# Patient Record
Sex: MALE | Race: BLACK OR AFRICAN AMERICAN | Employment: FULL TIME | URBAN - METROPOLITAN AREA
[De-identification: names, ages, dates, MRNs, and addresses within clinical notes are randomized per-mention and may not be internally consistent; named-entity substitution may affect disease eponyms.]

---

## 2019-02-09 ENCOUNTER — APPOINTMENT (OUTPATIENT)
Dept: GENERAL RADIOLOGY | Age: 44
End: 2019-02-09
Attending: EMERGENCY MEDICINE
Payer: COMMERCIAL

## 2019-02-09 ENCOUNTER — HOSPITAL ENCOUNTER (EMERGENCY)
Age: 44
Discharge: HOME | End: 2019-02-09
Attending: EMERGENCY MEDICINE
Payer: COMMERCIAL

## 2019-02-09 VITALS
WEIGHT: 265 LBS | OXYGEN SATURATION: 96 % | SYSTOLIC BLOOD PRESSURE: 130 MMHG | TEMPERATURE: 99 F | HEART RATE: 94 BPM | RESPIRATION RATE: 19 BRPM | DIASTOLIC BLOOD PRESSURE: 91 MMHG | BODY MASS INDEX: 32.95 KG/M2 | HEIGHT: 75 IN

## 2019-02-09 DIAGNOSIS — I48.91 NEW ONSET ATRIAL FIBRILLATION (HCC): Primary | ICD-10-CM

## 2019-02-09 LAB
ANION GAP SERPL CALC-SCNC: 14 MMOL/L (ref 5–15)
BASOPHILS # BLD: 0.1 K/UL (ref 0–0.1)
BASOPHILS NFR BLD: 1 % (ref 0–1)
BUN SERPL-MCNC: 13 MG/DL (ref 6–20)
BUN/CREAT SERPL: 11 (ref 12–20)
CALCIUM SERPL-MCNC: 9.2 MG/DL (ref 8.5–10.1)
CHLORIDE SERPL-SCNC: 106 MMOL/L (ref 97–108)
CO2 SERPL-SCNC: 23 MMOL/L (ref 21–32)
COMMENT, HOLDF: NORMAL
CREAT SERPL-MCNC: 1.16 MG/DL (ref 0.7–1.3)
D DIMER PPP FEU-MCNC: <0.19 MG/L FEU (ref 0–0.65)
DIFFERENTIAL METHOD BLD: ABNORMAL
EOSINOPHIL # BLD: 0.2 K/UL (ref 0–0.4)
EOSINOPHIL NFR BLD: 2 % (ref 0–7)
ERYTHROCYTE [DISTWIDTH] IN BLOOD BY AUTOMATED COUNT: 14.1 % (ref 11.5–14.5)
GLUCOSE SERPL-MCNC: 145 MG/DL (ref 65–100)
HCT VFR BLD AUTO: 43.6 % (ref 36.6–50.3)
HGB BLD-MCNC: 14.3 G/DL (ref 12.1–17)
IMM GRANULOCYTES # BLD AUTO: 0 K/UL (ref 0–0.04)
IMM GRANULOCYTES NFR BLD AUTO: 0 % (ref 0–0.5)
LYMPHOCYTES # BLD: 4 K/UL (ref 0.8–3.5)
LYMPHOCYTES NFR BLD: 45 % (ref 12–49)
MAGNESIUM SERPL-MCNC: 2.2 MG/DL (ref 1.6–2.4)
MCH RBC QN AUTO: 28.7 PG (ref 26–34)
MCHC RBC AUTO-ENTMCNC: 32.8 G/DL (ref 30–36.5)
MCV RBC AUTO: 87.4 FL (ref 80–99)
MONOCYTES # BLD: 1 K/UL (ref 0–1)
MONOCYTES NFR BLD: 12 % (ref 5–13)
NEUTS SEG # BLD: 3.6 K/UL (ref 1.8–8)
NEUTS SEG NFR BLD: 41 % (ref 32–75)
NRBC # BLD: 0 K/UL (ref 0–0.01)
NRBC BLD-RTO: 0 PER 100 WBC
PLATELET # BLD AUTO: 344 K/UL (ref 150–400)
PMV BLD AUTO: 9.7 FL (ref 8.9–12.9)
POTASSIUM SERPL-SCNC: 3.7 MMOL/L (ref 3.5–5.1)
RBC # BLD AUTO: 4.99 M/UL (ref 4.1–5.7)
SAMPLES BEING HELD,HOLD: NORMAL
SODIUM SERPL-SCNC: 143 MMOL/L (ref 136–145)
T3FREE SERPL-MCNC: 2.7 PG/ML (ref 2.2–4)
T4 FREE SERPL-MCNC: 1 NG/DL (ref 0.8–1.5)
TROPONIN I SERPL-MCNC: <0.05 NG/ML
TSH SERPL DL<=0.05 MIU/L-ACNC: 3.2 UIU/ML (ref 0.36–3.74)
WBC # BLD AUTO: 8.9 K/UL (ref 4.1–11.1)

## 2019-02-09 PROCEDURE — 85379 FIBRIN DEGRADATION QUANT: CPT

## 2019-02-09 PROCEDURE — 84439 ASSAY OF FREE THYROXINE: CPT

## 2019-02-09 PROCEDURE — 93005 ELECTROCARDIOGRAM TRACING: CPT

## 2019-02-09 PROCEDURE — 74011000250 HC RX REV CODE- 250: Performed by: EMERGENCY MEDICINE

## 2019-02-09 PROCEDURE — 96374 THER/PROPH/DIAG INJ IV PUSH: CPT

## 2019-02-09 PROCEDURE — 83735 ASSAY OF MAGNESIUM: CPT

## 2019-02-09 PROCEDURE — 74011250637 HC RX REV CODE- 250/637: Performed by: EMERGENCY MEDICINE

## 2019-02-09 PROCEDURE — 80048 BASIC METABOLIC PNL TOTAL CA: CPT

## 2019-02-09 PROCEDURE — 36415 COLL VENOUS BLD VENIPUNCTURE: CPT

## 2019-02-09 PROCEDURE — 84443 ASSAY THYROID STIM HORMONE: CPT

## 2019-02-09 PROCEDURE — 84484 ASSAY OF TROPONIN QUANT: CPT

## 2019-02-09 PROCEDURE — 71046 X-RAY EXAM CHEST 2 VIEWS: CPT

## 2019-02-09 PROCEDURE — 84481 FREE ASSAY (FT-3): CPT

## 2019-02-09 PROCEDURE — 99285 EMERGENCY DEPT VISIT HI MDM: CPT

## 2019-02-09 PROCEDURE — 85025 COMPLETE CBC W/AUTO DIFF WBC: CPT

## 2019-02-09 RX ORDER — DILTIAZEM HYDROCHLORIDE 5 MG/ML
10 INJECTION INTRAVENOUS ONCE
Status: COMPLETED | OUTPATIENT
Start: 2019-02-09 | End: 2019-02-09

## 2019-02-09 RX ORDER — DILTIAZEM HYDROCHLORIDE 60 MG/1
60 TABLET, FILM COATED ORAL 3 TIMES DAILY
Qty: 90 TAB | Refills: 0 | Status: SHIPPED | OUTPATIENT
Start: 2019-02-09 | End: 2019-02-13 | Stop reason: ALTCHOICE

## 2019-02-09 RX ORDER — DILTIAZEM HYDROCHLORIDE 30 MG/1
60 TABLET, FILM COATED ORAL
Status: COMPLETED | OUTPATIENT
Start: 2019-02-09 | End: 2019-02-09

## 2019-02-09 RX ADMIN — DILTIAZEM HYDROCHLORIDE 10 MG: 5 INJECTION INTRAVENOUS at 05:10

## 2019-02-09 RX ADMIN — DILTIAZEM HYDROCHLORIDE 60 MG: 30 TABLET, FILM COATED ORAL at 06:14

## 2019-02-09 RX ADMIN — APIXABAN 5 MG: 5 TABLET, FILM COATED ORAL at 06:15

## 2019-02-09 NOTE — ED PROVIDER NOTES
39 y.o. male with no significant past medical history presents ambulatory to ED with chief complaint of chest palpitations, onset around approximately 4:15 AM today. He explains that he woke up without any symptoms, noting that he was preparing to go to work, when he suddenly coughed twice and subsequently began experiencing chest palpitations. Pt also reports chronic BLE swelling that is worse on the left. Pt denies ever having experienced similar symptoms in the past. Pt reports that he drinks EtOH frequently, and that he had about two drinks last night. Of note, the pt believes that his father had cardiac-related problems. Pt denies any chest pain, shortness of breath, fevers, chills, nausea, vomiting, urinary symptoms, constipation, or diarrhea at this time. There are no other acute medical concerns at this time. Social hx: Patient reports light (cigars) Tobacco use. Reports 7.5 oz/week of EtOH use. Denies illicit drug abuse. PCP: Brina Salgado MD 
 
Note written by Mar Holden, as dictated by Pam Gomez MD 3:57 AM 
 
 
 
The history is provided by the patient. No  was used. No past medical history on file. No past surgical history on file. Family History:  
Problem Relation Age of Onset  Diabetes Father  Hypertension Mother Social History Socioeconomic History  Marital status:  Spouse name: Not on file  Number of children: Not on file  Years of education: Not on file  Highest education level: Not on file Social Needs  Financial resource strain: Not on file  Food insecurity - worry: Not on file  Food insecurity - inability: Not on file  Transportation needs - medical: Not on file  Transportation needs - non-medical: Not on file Occupational History  Not on file Tobacco Use  Smoking status: Light Tobacco Smoker  Smokeless tobacco: Never Used Substance and Sexual Activity  Alcohol use: Yes Alcohol/week: 7.5 oz Types: 15 Standard drinks or equivalent per week  Drug use: No  
  Comment: A cig 1 a month  Sexual activity: No  
  Birth control/protection: None Other Topics Concern  Not on file Social History Narrative  Not on file ALLERGIES: Patient has no known allergies. Review of Systems Constitutional: Negative for chills and fever. HENT: Negative for ear pain and sore throat. Eyes: Negative for pain. Respiratory: Negative for chest tightness and shortness of breath. Cardiovascular: Positive for palpitations and leg swelling (chronic). Negative for chest pain. Gastrointestinal: Negative for abdominal pain, constipation, diarrhea, nausea and vomiting. Genitourinary: Negative for dysuria and flank pain. Musculoskeletal: Negative for back pain. Skin: Negative for rash. Neurological: Negative for headaches. All other systems reviewed and are negative. Vitals:  
 02/09/19 0347 BP: (!) 137/107 Pulse: (!) 112 Resp: 22 Temp: 99 °F (37.2 °C) SpO2: 98% Weight: 120.2 kg (265 lb) Height: 6' 3\" (1.905 m) Physical Exam  
Constitutional: No distress. HENT:  
Head: Normocephalic and atraumatic. Eyes: Conjunctivae are normal. Pupils are equal, round, and reactive to light. No scleral icterus. Neck: No tracheal deviation present. Cardiovascular: An irregularly irregular rhythm present. Tachycardia present. Pulmonary/Chest: Effort normal and breath sounds normal. No stridor. No respiratory distress. Abdominal: Soft. He exhibits no distension. There is no tenderness. Musculoskeletal: He exhibits no edema or deformity. Neurological: He is alert. Skin: Skin is warm and dry. Psychiatric: He has a normal mood and affect. Nursing note and vitals reviewed. Note written by Kenneth Ignacio, as dictated by Maria Esther Raphael MD 3:57 AM  
 
MDM Procedures 5:39 AM Upon further discussion with patient, he cannot say that the onset was definitely this morning. Discussed risks and benefits of cardioversion. Will start on Eliquis and Metoprolol and instruct to follow up with Cardiology in 72 hours. 6:11 AM Spoke to Dr. Sukumar Jacobs via tigertext. Will follow up patient on Wednesday. Agrees with Cardizem and Eliquis. 6:35 AM 
The patient has been reevaluated. The patient is ready for discharge. The patient's signs, symptoms, diagnosis, and discharge instructions have been discussed and the patient/ family has conveyed their understanding. The patient is to follow up as recommended or return to the ED should their symptoms worsen. Plan has been discussed and the patient is in agreement. LABORATORY TESTS: 
Labs Reviewed CBC WITH AUTOMATED DIFF - Abnormal; Notable for the following components:  
    Result Value  
 ABS. LYMPHOCYTES 4.0 (*) All other components within normal limits METABOLIC PANEL, BASIC - Abnormal; Notable for the following components:  
 Glucose 145 (*)   
 BUN/Creatinine ratio 11 (*) All other components within normal limits SAMPLES BEING HELD  
D DIMER MAGNESIUM  
TROPONIN I  
T3, FREE  
TSH 3RD GENERATION  
T4, FREE IMAGING RESULTS: 
Xr Chest Pa Lat Result Date: 2/9/2019 Chest PA and lateral History: Chest pain. Rapid heart rate. Comparison: 8/22/2012 Findings: The lungs are well expanded. No focal consolidation, pleural effusion, or pneumothorax. The cardiomediastinal silhouette is unremarkable. The visualized osseous structures are unremarkable. Impression: No acute cardiopulmonary process. MEDICATIONS GIVEN: 
Medications  
dilTIAZem (CARDIZEM) injection 10 mg (10 mg IntraVENous Given 2/9/19 0210)  
apixaban (ELIQUIS) tablet 5 mg (5 mg Oral Given 2/9/19 0615) dilTIAZem (CARDIZEM) IR tablet 60 mg (60 mg Oral Given 2/9/19 0614) IMPRESSION: 
1. New onset atrial fibrillation ([address] PLAN: 1. Discharge Medication List as of 2/9/2019  6:11 AM  
  
START taking these medications Details  
apixaban (ELIQUIS) 5 mg tablet Take 1 Tab by mouth two (2) times a day for 30 days. , Print, Disp-60 Tab, R-0  
  
dilTIAZem (CARDIZEM) 60 mg tablet Take 1 Tab by mouth three (3) times daily for 30 days. , Print, Disp-90 Tab, R-0  
  
  
 
2. Follow-up Information Follow up With Specialties Details Why Contact Info Nghia Art, DO Cardiology Go to Wednesday [id number] [facility] [address] [place] [address] 
[phone] [facility] EMERGENCY DEPT Emergency Medicine  If symptoms worsen or new concerns [address] 
[phone] 3. Return to ED for new or worsening symptoms Ange Mukherjee.  Sohail Sims MD

## 2019-02-09 NOTE — DISCHARGE INSTRUCTIONS
Patient Education        Learning About Atrial Fibrillation  What is atrial fibrillation? Atrial fibrillation (say \"AY-tree-gael qkh-zlxn-ZDQ-shun\") is the most common type of irregular heartbeat (arrhythmia). Normally, the heart beats in a strong, steady rhythm. In atrial fibrillation, a problem with the heart's electrical system causes the two upper parts of the heart (the atria) to quiver, or fibrillate. Your heart rate also may be faster than normal.  Atrial fibrillation can be dangerous because if the heartbeat isn't strong and steady, blood can collect, or pool, in the atria. And pooled blood is more likely to form clots. Clots can travel to the brain, block blood flow, and cause a stroke. Atrial fibrillation can also lead to heart failure. Treatment for atrial fibrillation helps prevent stroke and heart failure. It also helps relieve symptoms. Atrial fibrillation is often caused by another heart problem. It may happen after heart surgery. It may also be caused by other problems, such as an overactive thyroid gland or lung disease. Many people with atrial fibrillation are able to live full and active lives. What are the symptoms? Some people feel symptoms when they have episodes of atrial fibrillation. But other people don't notice any symptoms. If you have symptoms, you may feel:  · A fluttering, racing, or pounding feeling in your chest called palpitations. · Weak or tired. · Dizzy or lightheaded. · Short of breath. · Chest pain. · Confused. You may notice signs of atrial fibrillation when you check your pulse. Your pulse may seem uneven or fast.  What can you expect when you have atrial fibrillation? At first, spells of atrial fibrillation may come on suddenly and last a short time. It may go away on its own or it goes away after treatment. This is called paroxysmal atrial fibrillation. Over time, the spells may last longer and occur more often. They often don't go away on their own.   How is it treated? Treatments can help you feel better and prevent future problems, especially stroke and heart failure. The main types of treatment slow the heart rate, control the heart rhythm, and help prevent stroke. Your treatment will depend on the cause of your atrial fibrillation, your symptoms, and your risk for stroke. · Heart rate treatment. Medicine may be used to slow your heart rate. Your heartbeat may still be irregular. But these medicines keep your heart from beating too fast. They may also help relieve your symptoms. · Heart rhythm treatment. Different treatments may be used to try to stop atrial fibrillation and keep it from returning. They can also relieve symptoms. These treatments include medicine, electrical cardioversion to shock the heart back to a normal rhythm, a procedure called catheter ablation, and heart surgery. · Stroke prevention. You and your doctor can decide how to lower your risk. You may decide to take a blood-thinning medicine, such as aspirin or an anticoagulant. How can you live well with it? You can live well and help manage atrial fibrillation by having a heart-healthy lifestyle. To have a heart-healthy lifestyle:  · Don't smoke. · Eat heart-healthy foods. · Be active. Talk to your doctor about what type and level of exercise is safe for you. · Stay at a healthy weight. Lose weight if you need to. · Manage stress. · Avoid alcohol if it triggers symptoms. · Manage other health problems such as high blood pressure, high cholesterol, and diabetes. · Avoid getting sick from the flu. Get a flu shot every year. Where can you learn more? Go to http://ceci-fiorella.info/. Enter 077-316-9865 in the search box to learn more about \"Learning About Atrial Fibrillation. \"  Current as of: July 22, 2018  Content Version: 11.9  © 5139-5582 [employer].  Care instructions adapted under license by [employer] (which disclaims liability or warranty for this information). If you have questions about a medical condition or this instruction, always ask your healthcare professional. [place] [address] any warranty or liability for your use of this information.

## 2019-02-09 NOTE — ED TRIAGE NOTES
Pt. States started with feeling of rapid heart rate about 30 minutes, denies pain, nausea, vomiting, sob, diaphoresis.

## 2019-02-11 LAB
ATRIAL RATE: 117 BPM
CALCULATED R AXIS, ECG10: 23 DEGREES
CALCULATED T AXIS, ECG11: 38 DEGREES
DIAGNOSIS, 93000: NORMAL
Q-T INTERVAL, ECG07: 298 MS
QRS DURATION, ECG06: 84 MS
QTC CALCULATION (BEZET), ECG08: 410 MS
VENTRICULAR RATE, ECG03: 114 BPM

## 2019-02-13 ENCOUNTER — OFFICE VISIT (OUTPATIENT)
Dept: CARDIOLOGY CLINIC | Age: 44
End: 2019-02-13

## 2019-02-13 VITALS
HEART RATE: 80 BPM | WEIGHT: 267.6 LBS | BODY MASS INDEX: 33.27 KG/M2 | DIASTOLIC BLOOD PRESSURE: 64 MMHG | HEIGHT: 75 IN | SYSTOLIC BLOOD PRESSURE: 112 MMHG | RESPIRATION RATE: 16 BRPM

## 2019-02-13 DIAGNOSIS — I48.0 PAROXYSMAL ATRIAL FIBRILLATION (HCC): Primary | ICD-10-CM

## 2019-02-13 NOTE — PROGRESS NOTES
[facility] [address], [address]    Office [phone],[place] [phone]           Oralia Ocasio is a 39 y.o. male ED follow-up for atrial fibrillation      Assessment/Recommendations:    Paroxysmal atrial fibrillation-he is in normal sinus rhythm in the office today. We had a long discussion about medication use for atrial fibrillation and therapies. CHADS VASC-0. He does not wish to be on long-term medical therapy. Given his paroxysmal symptoms we will obtain echocardiogram.  If echocardiogram is normal we will prescribe  Pill in the pocket propafenone for use for paroxysmal atrial fibrillation. If he has more frequent paroxysmal atrial fibrillations will consider ablation therapy. Recommend aspirin 81 mg daily and discontinuing apixaban therapy. We will also discontinue diltiazem therapy. Primary Care Physician- Reyes Bundy MD    Follow-up 3 months     Total time: 50 minutes spent with patient:greater than 50% spent in counseling/coordinating care regarding atrial fibrillation . Subjective:  41-year-old male without significant past medical history presented to the emergency department on Saturday with palpitations. Patricm Abram he woke up and was on his way to work when he felt his heart racing subsequently seen in the ER. Found to be in atrial fibrillation with rapid ventricular response. On further recollection he feels like he has had similar symptoms in the past but thought it was just anxiety. He was started on diltiazem therapy and started on apixaban. He continued to have some degree of palpitations that then resolved until Sunday. No further palpitations since Sunday. He does also report having some chest wall pain for several years. He feels this tightness in his chest that is worse when he moves  moves his arms around last several hours and then resolves with ibuprofen therapy.   Was also found to have mild hyperglycemia on arrival to the ED, however he did eat just before. Risk factors for atrial fibrillation include alcohol use. He does have frequently binge drink, and it appears that he did have excessive alcohol use the night before the onset of his atrial fibrillation. He does report snoring but is without any daytime sleepiness. Medical history  Recently diagnosed atrial fibrillation    No past surgical history on file. Medications  Apixaban 5 mg twice daily  Diltiazem 60 mg daily    No Known Allergies     Family History   Problem Relation Age of Onset    Diabetes Father     Hypertension Mother        Social History     Tobacco Use    Smoking status: Light Tobacco Smoker    Smokeless tobacco: Never Used   Substance Use Topics    Alcohol use: Yes     Alcohol/week: 7.5 oz     Types: 15 Standard drinks or equivalent per week    Drug use: No     Comment: A cig 1 a month       Review of Symptoms:  Pertinent Positive: Palpitations with associated anxiety, chest pain  pertinent Negative: No shortness of breath orthopnea PND  All Other systems reviewed and are negative for a Comprehensive ROS (10+)    Physical Exam    Blood pressure 112/64, pulse 80, resp. rate 16, height 6' 3\" (1.905 m), weight 267 lb 9.6 oz (121.4 kg). Constitutional:  well-developed and well-nourished. No distress. HENT: Normocephalic. Eyes: No scleral icterus. Neck:  Neck supple. No JVD present. Pulmonary/Chest: Effort normal and breath sounds normal. No respiratory distress, wheezes or rales. Cardiovascular: Normal rate, regular rhythm, S1 S2 . Exam reveals no gallop and no friction rub. No murmur heard. No edema. Extremities:  Normal muscle tone  Abdominal:   No abnormal distension. Neurological:  Moving all extremities, cranial nerves appear grossly intact. Skin: Skin is not cold. Not diaphoretic. No erythema. Psychiatric:  Grossly normal mood and affect. Intact insight.     Objective Data:    ECG: personally reviewed and  intrepreted  2/9/2019 atrial fibrillation, heart rates 114 bpm              Tom Atkinson DO

## 2019-02-13 NOTE — PROGRESS NOTES
Visit Vitals  /64 (BP 1 Location: Left arm)   Pulse 80 Comment: regular   Resp 16   Ht 6' 3\" (1.905 m)   Wt 267 lb 9.6 oz (121.4 kg)   BMI 33.45 kg/m²       Patient is here for follow up. Doing well. No complaints.

## 2020-02-05 ENCOUNTER — OFFICE VISIT (OUTPATIENT)
Dept: CARDIOLOGY CLINIC | Age: 45
End: 2020-02-05

## 2020-02-05 VITALS
HEIGHT: 75 IN | SYSTOLIC BLOOD PRESSURE: 120 MMHG | OXYGEN SATURATION: 97 % | WEIGHT: 276 LBS | DIASTOLIC BLOOD PRESSURE: 78 MMHG | HEART RATE: 75 BPM | BODY MASS INDEX: 34.32 KG/M2

## 2020-02-05 DIAGNOSIS — R00.2 PALPITATIONS: ICD-10-CM

## 2020-02-05 DIAGNOSIS — E78.5 HYPERLIPIDEMIA, UNSPECIFIED HYPERLIPIDEMIA TYPE: ICD-10-CM

## 2020-02-05 DIAGNOSIS — I49.1 PAC (PREMATURE ATRIAL CONTRACTION): ICD-10-CM

## 2020-02-05 DIAGNOSIS — I48.0 PAROXYSMAL ATRIAL FIBRILLATION (HCC): Primary | ICD-10-CM

## 2020-02-05 RX ORDER — ROSUVASTATIN CALCIUM 10 MG/1
TABLET, COATED ORAL
COMMUNITY
Start: 2020-01-15

## 2020-02-05 RX ORDER — PROPAFENONE HYDROCHLORIDE 300 MG/1
600 TABLET, COATED ORAL
Qty: 2 TAB | Refills: 1 | Status: SHIPPED | OUTPATIENT
Start: 2020-02-05 | End: 2020-02-05

## 2020-02-05 NOTE — PROGRESS NOTES
[facility] [address], [address]    Office [phone],[facility] [phone]           Lisa Gama is a 38 y.o. male ED follow-up for atrial fibrillation      Assessment/Recommendations:    Paroxysmal atrial fibrillation- 2/9/2019, presented to ED with ~ several hours of heart racing. No symptoms since last February  CHADS VASC-0. Echo 2/2019. No structural heart disease  - \"pill in the pocket\" propafenone 600mg. Instructed patient to notify me when he has to use medication. Palpitations, probable symptomatic PACs- reports a few per week. Significant last week due to energy drinks. Discussed to avoid stimulants and energy drinks. Consider AV rah agents if he has increase in symptomatic PACs. Hyperlipidemia- statin per Tori Reyes MD        Primary Care Physician- Tori Reyes MD    Follow-up 6 months     Subjective:    38 y.o. presents for f/up of atrial fibrillation and palpitation. He was diagnosed with pAF February 2019. No recurrent episodes of sustained palpitations. He reports prior history of \"heart flutters\" and \"skipped heart beats\" lasting a few seconds. He used to have symptoms frequently, but decrease in energy drinks and alcohol consumption has decreased palpitations. He reports last Friday, he drank and energy drink on his way to [place]. For about any hour he had several episodes of palpitations, each lasting a few seconds. He is having a \"few\" episodes a week. Reports no palpitations since last Friday. Exercise ~ 2 times per week at the gym. No chest pain, SOB. Medical history  atrial fibrillation    No past surgical history on file. Current Outpatient Medications on File Prior to Visit   Medication Sig Dispense Refill    rosuvastatin (CRESTOR) 10 mg tablet TAKE 1 TABLET BY MOUTH EVERYDAY AT BEDTIME       No current facility-administered medications on file prior to visit. No Known Allergies     Family History   Problem Relation Age of Onset    Diabetes Father     Hypertension Mother        Social History     Tobacco Use    Smoking status: Light Tobacco Smoker    Smokeless tobacco: Never Used   Substance Use Topics    Alcohol use: Yes     Alcohol/week: 12.5 standard drinks     Types: 15 Standard drinks or equivalent per week    Drug use: No     Comment: A cig 1 a month       Review of Symptoms:  Pertinent Positive: Palpitations with associated anxiety, chest pain  pertinent Negative: No shortness of breath orthopnea PND  All Other systems reviewed and are negative for a Comprehensive ROS (10+)    Physical Exam    Blood pressure 120/78, pulse 75, height 6' 3\" (1.905 m), weight 276 lb (125.2 kg), SpO2 97 %. Constitutional:  well-developed and well-nourished. No distress. HENT: Normocephalic. Eyes: No scleral icterus. Neck:  Neck supple. No JVD present. Pulmonary/Chest: Effort normal and breath sounds normal. No respiratory distress, wheezes or rales. Cardiovascular: Normal rate, regular rhythm, S1 S2 . Exam reveals no gallop and no friction rub. No murmur heard. No edema. Extremities:  Normal muscle tone  Abdominal:   No abnormal distension. Neurological:  Moving all extremities, cranial nerves appear grossly intact. Skin: Skin is not cold. Not diaphoretic. No erythema. Psychiatric:  Grossly normal mood and affect. Intact insight. Objective Data:    ECG: personally reviewed and  intrepreted  2/9/2019 atrial fibrillation, heart rates 114 bpm    02/20/19   ECHO ADULT COMPLETE 02/25/2019 2/25/2019    Narrative · Estimated left ventricular ejection fraction is 61 - 65%. · Trace mitral valve regurgitation.      BP: 112/74 P:74       Signed by: Tony Huang, [address] [facility],

## 2020-02-05 NOTE — PROGRESS NOTES
Noé Stephens is a 38 y.o. male    Chief Complaint   Patient presents with    Follow-up     PAF       Chest pain Patient states he has muscle spams in his chest wall    SOB No    Dizziness No    Swelling No    Refills No    Visit Vitals  /78 (BP 1 Location: Left arm, BP Patient Position: Sitting)   Pulse 75   Ht 6' 3\" (1.905 m)   Wt 276 lb (125.2 kg)   SpO2 97%   BMI 34.50 kg/m²       1. Have you been to the ER, urgent care clinic since your last visit? Hospitalized since your last visit? No    2. Have you seen or consulted any other health care providers outside of the [address] [place] [address] since your last visit? Include any pap smears or colon screening.   No

## 2020-02-05 NOTE — LETTER
2/5/20 Patient: Chelly Mc  
YOB: 1975 Date of Visit: 2/5/2020 Carmen Summers MD 
[address] [address] VIA Facsimile: [phone] Dear Carmen Summers MD, Thank you for referring Mr. Cyrena Epley to [facility] for evaluation. My notes for this consultation are attached. If you have questions, please do not hesitate to call me. I look forward to following your patient along with you.  
 
 
Sincerely, 
 
Bekah Sams, DO